# Patient Record
Sex: FEMALE | Race: WHITE | NOT HISPANIC OR LATINO
[De-identification: names, ages, dates, MRNs, and addresses within clinical notes are randomized per-mention and may not be internally consistent; named-entity substitution may affect disease eponyms.]

---

## 2022-04-22 ENCOUNTER — APPOINTMENT (OUTPATIENT)
Dept: ORTHOPEDIC SURGERY | Facility: CLINIC | Age: 22
End: 2022-04-22
Payer: COMMERCIAL

## 2022-04-22 VITALS — BODY MASS INDEX: 27.05 KG/M2 | WEIGHT: 147 LBS | HEIGHT: 62 IN

## 2022-04-22 DIAGNOSIS — J45.909 UNSPECIFIED ASTHMA, UNCOMPLICATED: ICD-10-CM

## 2022-04-22 DIAGNOSIS — M22.2X1 PATELLOFEMORAL DISORDERS, RIGHT KNEE: ICD-10-CM

## 2022-04-22 PROBLEM — Z00.00 ENCOUNTER FOR PREVENTIVE HEALTH EXAMINATION: Status: ACTIVE | Noted: 2022-04-22

## 2022-04-22 PROCEDURE — 99204 OFFICE O/P NEW MOD 45 MIN: CPT

## 2022-04-22 RX ORDER — MELOXICAM 15 MG/1
15 TABLET ORAL
Qty: 60 | Refills: 1 | Status: ACTIVE | COMMUNITY
Start: 2022-04-22 | End: 1900-01-01

## 2022-04-22 NOTE — PHYSICAL EXAM
[NL (140)] : flexion 140 degrees [NL (0)] : extension 0 degrees [5___] : hamstring 5[unfilled]/5 [Negative] : negative posterior draw [] : patient ambulates without assistive device [AP] : anteroposterior [Lateral] : lateral [AP Standing] : anteroposterior standing [Outside films reviewed] : Outside films reviewed [There are no fractures, subluxations or dislocations. No significant abnormalities are seen] : There are no fractures, subluxations or dislocations. No significant abnormalities are seen

## 2022-04-22 NOTE — ASSESSMENT
[FreeTextEntry1] : 22F p/w R PF syndrome\par \par Begin PT\par Meloxicam for pain\par return when done with cruise\par \par The patient was advised of the diagnosis. The natural history of the pathology was explained in full to the patient in layman's terms. All questions were answered. The risks and benefits of surgical and non-surgical treatment alternatives were explained in full to the patient. \par

## 2022-04-22 NOTE — HISTORY OF PRESENT ILLNESS
[Gradual] : gradual [Dull/Aching] : dull/aching [Constant] : constant [Rest] : rest [Bending/leaning forward] : bending/leaning forward [Standing] : standing [Walking] : walking [de-identified] : 4/22/22: 23 y/o F presenting with right knee pain for 1 month. Denies any specific injury. States pain with running. Patient is a . \par Not taking anything for the pain. Has not done PT for the knee. Has pain with extension and when standing up from sitting.  [] : no [FreeTextEntry1] : right knee [FreeTextEntry3] : a month ago [FreeTextEntry5] :  no injury  [de-identified] : acivity  [de-identified] : 04/11/22 [de-identified] : urgent care  [de-identified] : XR

## 2023-03-16 ENCOUNTER — APPOINTMENT (OUTPATIENT)
Dept: ORTHOPEDIC SURGERY | Facility: CLINIC | Age: 23
End: 2023-03-16
Payer: COMMERCIAL

## 2023-03-16 ENCOUNTER — RESULT CHARGE (OUTPATIENT)
Age: 23
End: 2023-03-16

## 2023-03-16 VITALS — BODY MASS INDEX: 28.52 KG/M2 | HEIGHT: 62 IN | WEIGHT: 155 LBS

## 2023-03-16 DIAGNOSIS — S43.432A SUPERIOR GLENOID LABRUM LESION OF LEFT SHOULDER, INITIAL ENCOUNTER: ICD-10-CM

## 2023-03-16 DIAGNOSIS — M25.512 PAIN IN LEFT SHOULDER: ICD-10-CM

## 2023-03-16 PROCEDURE — 99214 OFFICE O/P EST MOD 30 MIN: CPT

## 2023-03-16 PROCEDURE — 73030 X-RAY EXAM OF SHOULDER: CPT | Mod: LT

## 2023-03-16 RX ORDER — MELOXICAM 15 MG/1
15 TABLET ORAL DAILY
Qty: 30 | Refills: 0 | Status: COMPLETED | COMMUNITY
Start: 2023-03-16 | End: 2023-04-15

## 2023-03-16 NOTE — REASON FOR VISIT
[FreeTextEntry2] : New Injury- left shoulder fell a month ago snowboarding and has cont lt shoulder pain \par reports pain and loss of motion

## 2023-03-16 NOTE — PHYSICAL EXAM
[Left] : left shoulder [Sitting] : sitting [Mild] : mild [5 ___] : forward flexion 5[unfilled]/5 [5___] : internal rotation 5[unfilled]/5 [] : negative drop-arm test [TWNoteComboBox7] : active forward flexion 150 degrees [de-identified] : active abduction 95 degrees [TWNoteComboBox6] : internal rotation 60 degrees [de-identified] : external rotation 70 degrees

## 2023-03-16 NOTE — HISTORY OF PRESENT ILLNESS
[6] : 6 [0] : 0 [Dull/Aching] : dull/aching [Localized] : localized [Sharp] : sharp [Intermittent] : intermittent [Leisure] : leisure [Meds] : meds [] : no [FreeTextEntry5] : SHAUN is a 22 year old F who is here today for left shoulder injury. Patient injured shoulder when she was snowboarding, she fell forward and landed on shoulder. Patient states they have a limited range of motion. Patient denies any numbness/tingling going down arm. [de-identified] : lifting arm up

## 2023-03-16 NOTE — DISCUSSION/SUMMARY
[de-identified] : General Dx Discussion\par The patient was advised of the diagnosis. The natural history of the pathology was explained in full to the patient in layman's terms. All questions were answered. The risks and benefits of surgical and non-surgical treatment alternatives were explained in full to the patient.\par \par will get a mri lt shoulder to eval for labral tear \par poss dislocation with reduction discussed questions answered \par will f/u Dr Brandt after mri

## 2023-03-23 ENCOUNTER — FORM ENCOUNTER (OUTPATIENT)
Age: 23
End: 2023-03-23

## 2023-03-24 ENCOUNTER — APPOINTMENT (OUTPATIENT)
Dept: MRI IMAGING | Facility: CLINIC | Age: 23
End: 2023-03-24
Payer: COMMERCIAL

## 2023-03-24 PROCEDURE — 73221 MRI JOINT UPR EXTREM W/O DYE: CPT | Mod: LT

## 2023-03-30 ENCOUNTER — APPOINTMENT (OUTPATIENT)
Dept: ORTHOPEDIC SURGERY | Facility: CLINIC | Age: 23
End: 2023-03-30
Payer: COMMERCIAL

## 2023-03-30 VITALS — BODY MASS INDEX: 28.52 KG/M2 | WEIGHT: 155 LBS | HEIGHT: 62 IN

## 2023-03-30 DIAGNOSIS — M75.02 ADHESIVE CAPSULITIS OF LEFT SHOULDER: ICD-10-CM

## 2023-03-30 DIAGNOSIS — S43.432D SUPERIOR GLENOID LABRUM LESION OF LEFT SHOULDER, SUBSEQUENT ENCOUNTER: ICD-10-CM

## 2023-03-30 PROCEDURE — 99213 OFFICE O/P EST LOW 20 MIN: CPT

## 2023-03-30 RX ORDER — METHYLPREDNISOLONE 4 MG/1
4 TABLET ORAL
Qty: 1 | Refills: 0 | Status: ACTIVE | COMMUNITY
Start: 2023-03-30 | End: 1900-01-01

## 2023-03-31 NOTE — PHYSICAL EXAM
[Left] : left shoulder [Sitting] : sitting [Mild] : mild [5 ___] : forward flexion 5[unfilled]/5 [5___] : internal rotation 5[unfilled]/5 [] : negative drop-arm test [TWNoteComboBox7] : active forward flexion 155 degrees [de-identified] : active abduction 110 degrees [TWNoteComboBox6] : internal rotation 60 degrees [de-identified] : external rotation 70 degrees

## 2023-03-31 NOTE — HISTORY OF PRESENT ILLNESS
[6] : 6 [0] : 0 [Frequent] : frequent [Household chores] : household chores [Leisure] : leisure [Rest] : rest [Student] : Work status: student [de-identified] : Patient is here for MRI results of her left shoulder. Shoulder has become more painful at night. [] : no [FreeTextEntry1] : Left shoulder [FreeTextEntry6] : pulling [de-identified] : extension [de-identified] : none

## 2023-03-31 NOTE — DATA REVIEWED
[MRI] : MRI [Left] : left [Shoulder] : shoulder [Report was reviewed and noted in the chart] : The report was reviewed and noted in the chart [FreeTextEntry1] : 1. Infraspinatus tendinopathy.\par 2. Capsular thickening more noted anterior, which can be seen with adhesive capsulitis.\par 3. Fraying and tear of the superior labrum. Biceps tendinopathy with tenosynovitis with fraying of the \par horizontal segment and anchor noted on sagittal imaging.\par 4. Trace glenohumeral joint effusion.

## 2023-03-31 NOTE — DISCUSSION/SUMMARY
[de-identified] : General Dx Discussion\par The patient was advised of the diagnosis. The natural history of the pathology was explained in full to the patient in layman's terms. All questions were answered. The risks and benefits of surgical and non-surgical treatment alternatives were explained in full to the patient.\par \par Case Discussed.\par mri reviewed will try a medrol  dose pack and PT \par poss of injection and surgery discussed questions answered

## 2023-05-25 ENCOUNTER — APPOINTMENT (OUTPATIENT)
Dept: ORTHOPEDIC SURGERY | Facility: CLINIC | Age: 23
End: 2023-05-25